# Patient Record
Sex: FEMALE | URBAN - METROPOLITAN AREA
[De-identification: names, ages, dates, MRNs, and addresses within clinical notes are randomized per-mention and may not be internally consistent; named-entity substitution may affect disease eponyms.]

---

## 2019-07-26 ENCOUNTER — APPOINTMENT (OUTPATIENT)
Age: 39
Setting detail: DERMATOLOGY
End: 2019-07-29

## 2019-07-26 VITALS — WEIGHT: 122 LBS | HEIGHT: 65 IN

## 2019-07-26 DIAGNOSIS — L81.1 CHLOASMA: ICD-10-CM

## 2019-07-26 PROCEDURE — 99213 OFFICE O/P EST LOW 20 MIN: CPT

## 2019-07-26 PROCEDURE — OTHER PRESCRIPTION: OTHER

## 2019-07-26 PROCEDURE — OTHER COUNSELING: OTHER

## 2019-07-26 RX ORDER — TRETINOIN 0.25 MG/G
CREAM TOPICAL QHS
Qty: 1 | Refills: 1 | Status: ERX | COMMUNITY
Start: 2019-07-26

## 2019-07-26 RX ORDER — HYDROQUINONE 4 %
CREAM (GRAM) TOPICAL QHS
Qty: 30 | Refills: 1 | Status: CANCELLED
Stop reason: CLARIF

## 2019-07-26 ASSESSMENT — LOCATION DETAILED DESCRIPTION DERM
LOCATION DETAILED: LEFT INFERIOR FOREHEAD
LOCATION DETAILED: LEFT INFERIOR MEDIAL MALAR CHEEK
LOCATION DETAILED: LEFT INFERIOR MEDIAL FOREHEAD

## 2019-07-26 ASSESSMENT — LOCATION ZONE DERM: LOCATION ZONE: FACE

## 2019-07-26 ASSESSMENT — LOCATION SIMPLE DESCRIPTION DERM
LOCATION SIMPLE: LEFT CHEEK
LOCATION SIMPLE: LEFT FOREHEAD

## 2019-07-26 NOTE — PROCEDURE: COUNSELING
Detail Level: Detailed
Patient Specific Counseling (Will Not Stick From Patient To Patient): Instead of hydroquinone again, she will purchase Lytera 2.0 today.

## 2019-12-03 ENCOUNTER — APPOINTMENT (OUTPATIENT)
Dept: GENERAL RADIOLOGY | Facility: CLINIC | Age: 39
End: 2019-12-03
Attending: EMERGENCY MEDICINE

## 2019-12-03 ENCOUNTER — HOSPITAL ENCOUNTER (EMERGENCY)
Facility: CLINIC | Age: 39
Discharge: HOME | End: 2019-12-03
Attending: EMERGENCY MEDICINE | Admitting: EMERGENCY MEDICINE

## 2019-12-03 VITALS
DIASTOLIC BLOOD PRESSURE: 64 MMHG | TEMPERATURE: 100.6 F | OXYGEN SATURATION: 97 % | HEART RATE: 71 BPM | SYSTOLIC BLOOD PRESSURE: 108 MMHG | RESPIRATION RATE: 18 BRPM

## 2019-12-03 DIAGNOSIS — J06.9 VIRAL URI WITH COUGH: ICD-10-CM

## 2019-12-03 LAB
DEPRECATED S PYO AG THROAT QL EIA: NORMAL
FLUAV+FLUBV AG SPEC QL: NEGATIVE
FLUAV+FLUBV AG SPEC QL: NEGATIVE
SPECIMEN SOURCE: NORMAL
SPECIMEN SOURCE: NORMAL

## 2019-12-03 PROCEDURE — 99284 EMERGENCY DEPT VISIT MOD MDM: CPT | Mod: 25

## 2019-12-03 PROCEDURE — 87081 CULTURE SCREEN ONLY: CPT | Performed by: EMERGENCY MEDICINE

## 2019-12-03 PROCEDURE — 87804 INFLUENZA ASSAY W/OPTIC: CPT | Performed by: EMERGENCY MEDICINE

## 2019-12-03 PROCEDURE — 87880 STREP A ASSAY W/OPTIC: CPT | Performed by: EMERGENCY MEDICINE

## 2019-12-03 PROCEDURE — 94640 AIRWAY INHALATION TREATMENT: CPT

## 2019-12-03 PROCEDURE — 25000125 ZZHC RX 250: Performed by: EMERGENCY MEDICINE

## 2019-12-03 PROCEDURE — 71046 X-RAY EXAM CHEST 2 VIEWS: CPT

## 2019-12-03 PROCEDURE — 25000132 ZZH RX MED GY IP 250 OP 250 PS 637: Performed by: EMERGENCY MEDICINE

## 2019-12-03 RX ORDER — IPRATROPIUM BROMIDE AND ALBUTEROL SULFATE 2.5; .5 MG/3ML; MG/3ML
3 SOLUTION RESPIRATORY (INHALATION) ONCE
Status: COMPLETED | OUTPATIENT
Start: 2019-12-03 | End: 2019-12-03

## 2019-12-03 RX ORDER — LORATADINE 10 MG/1
10 TABLET ORAL DAILY PRN
Qty: 30 TABLET | Refills: 0 | Status: SHIPPED | OUTPATIENT
Start: 2019-12-03 | End: 2023-02-02

## 2019-12-03 RX ORDER — PREDNISONE 20 MG/1
TABLET ORAL
Qty: 10 TABLET | Refills: 0 | Status: SHIPPED | OUTPATIENT
Start: 2019-12-03 | End: 2023-02-02

## 2019-12-03 RX ORDER — ALBUTEROL SULFATE 90 UG/1
2 AEROSOL, METERED RESPIRATORY (INHALATION) EVERY 6 HOURS PRN
Qty: 1 INHALER | Refills: 0 | Status: SHIPPED | OUTPATIENT
Start: 2019-12-03 | End: 2023-02-02

## 2019-12-03 RX ORDER — IBUPROFEN 200 MG
600 TABLET ORAL EVERY 6 HOURS PRN
Qty: 60 TABLET | Refills: 0 | Status: SHIPPED | OUTPATIENT
Start: 2019-12-03 | End: 2023-02-02

## 2019-12-03 RX ORDER — IBUPROFEN 600 MG/1
600 TABLET, FILM COATED ORAL ONCE
Status: COMPLETED | OUTPATIENT
Start: 2019-12-03 | End: 2019-12-03

## 2019-12-03 RX ADMIN — IPRATROPIUM BROMIDE AND ALBUTEROL SULFATE 3 ML: .5; 3 SOLUTION RESPIRATORY (INHALATION) at 18:23

## 2019-12-03 RX ADMIN — IBUPROFEN 600 MG: 600 TABLET, FILM COATED ORAL at 18:22

## 2019-12-03 ASSESSMENT — ENCOUNTER SYMPTOMS
VOMITING: 0
COUGH: 1
NAUSEA: 1
SORE THROAT: 1
RHINORRHEA: 1
FEVER: 1

## 2019-12-03 NOTE — ED PROVIDER NOTES
History     Chief Complaint:  Pharyngitis and Cough      The history is provided by the spouse and the patient. The history is limited by a language barrier. No  was used ( interpreted).      Crescencio Cruz is a 39 year old female who presents with pharyngitis and a cough. Patient reports cough, rhinorrhea, and sore throat.  reports that the patient has had these symptoms for about 15 days. The rest of the patient's family has similar symptoms and patient's daughter had pneumonia about two weeks ago. However, patient comes in today because her symptoms are the worst of anyone in the family. Alongside her respiratory symptoms, patient endorses nausea and fever; temperature is 100.6 here. She has not vomited.     Allergies:  No Known Drug Allergies     Medications:    The patient is currently on no regular medications.    Past Medical History:    Heart murmur     Past Surgical History:    Tubal ligation     Family History:    Mother - myocardial infarction      Social History:  The patient was accompanied to the ED by her  and daughter.  Smoking Status: Never  Smokeless Tobacco: No  Alcohol Use: No   Marital Status:        Review of Systems   Constitutional: Positive for fever.   HENT: Positive for rhinorrhea and sore throat.    Respiratory: Positive for cough.    Gastrointestinal: Positive for nausea. Negative for vomiting.   All other systems reviewed and are negative.    Physical Exam     Patient Vitals for the past 24 hrs:   BP Temp Temp src Pulse Resp SpO2   12/03/19 1731 108/64 100.6  F (38.1  C) Temporal 71 18 97 %       Physical Exam  Constitutional: Vital signs reviewed as above.   HEENT:    Head: No external signs of trauma. No lesions noted.   Eyes: PEERL, EOMI B/L, no pain or limitation of superior gaze   Ears: Normal B/L TM and external canals   Nose: Noncongested, no exudates. No rhinorrhea. No FB noted   Mouth/Throat:     Mucous membranes are moist and  normal.     No Oropharyngeal exudate. No oropharyngeal erythema noted.    No tonsilar swelling noted.     No uvular deviation noted.    No swelling noted on the floor of the mouth  Neck: FROM. Neck is supple  Lymphatic: No posterior cervical LAD noted.   Cardiovascular: Normal rate, regular rhythm and normal heart sounds.  No murmur heard. Equal B/L peripheral pulses.  Pulmonary/Chest: Effort normal and breath sounds normal. No respiratory distress. Patient has no wheezes. Patient has faint crackles in the B/L bases.   Gastrointestinal: Soft. There is no tenderness.   Musculoskeletal/Extremities: No edema noted. Normal tone.  Neurological: Patient is alert and oriented to person, place, and time.   Skin: Skin is warm and dry. There is no diaphoresis noted.   Psychiatric: The patient appears calm.      Emergency Department Course     Imaging:  Radiology findings were communicated with the patient and family who voiced understanding of the findings.    Chest XR,  PA & LAT  Final Result  IMPRESSION: Lungs clear. Heart and hilar structures normal size.  Report per radiology     Laboratory:  Laboratory findings were communicated with the patient and family who voiced understanding of the findings.    Rapid strep screen: negative   Beta strep group A culture: pending      Influenza A/B Antigen: negative      Interventions:  1822 Ibuprofen 600mg PO   1823 DuoNeb 3mL Nebulization      Emergency Department Course:  Past medical records, nursing notes, and vitals reviewed.    1810 I performed an exam of the patient as documented above.     The patient's throat was swabbed and this sample was sent for rapid strep screen, findings above.   The patient's nose was swabbed and this sample was sent for influenza antigen, findings above.   The patient was sent for a CXR while in the emergency department, results above.     1939 I rechecked the patient and discussed the results of her workup thus far.     Findings and plan explained  to the Patient and spouse. Patient discharged home with instructions regarding supportive care, medications, and reasons to return. The importance of close follow-up was reviewed. The patient was prescribed Motrin, Albuterol, Prednisone, and Claritin.    I personally reviewed the laboratory and imaging results with the Patient and spouse and answered all related questions prior to discharge.     Impression & Plan     Medical Decision Making:  This 39-year-old female patient presents the ED due to upper respiratory infection and fever.  Please see the HPI and exam for specifics.  The patient's work-up, fortunately, was negative.  At this time I believe she can be discharged and she can treat what is likely a viral upper respiratory infection at home.  I will list a primary care clinic for further follow-up and the patient can return to the ED if she has worsening or concerning symptoms.  Anticipatory guidance given prior to discharge.    Discharge Diagnosis:    ICD-10-CM    1. Viral URI with cough J06.9     B97.89        Disposition:  Discharged to home.    Discharge Medications:  New Prescriptions    ALBUTEROL (PROAIR HFA/PROVENTIL HFA/VENTOLIN HFA) 108 (90 BASE) MCG/ACT INHALER    Inhale 2 puffs into the lungs every 6 hours as needed for shortness of breath / dyspnea or wheezing    IBUPROFEN (ADVIL/MOTRIN) 200 MG TABLET    Take 3 tablets (600 mg) by mouth every 6 hours as needed for mild pain or fever    LORATADINE (CLARITIN) 10 MG TABLET    Take 1 tablet (10 mg) by mouth daily as needed for allergies    PREDNISONE (DELTASONE) 20 MG TABLET    Take two tablets (= 40mg) each day for 5 (five) days       Scribe Disclosure:  IMarga, am serving as a scribe at 5:33 PM on 12/3/2019 to document services personally performed by Danny Roldan DO based on my observations and the provider's statements to me.   12/3/2019   [facility] EMERGENCY DEPARTMENT       Danny Roldan DO  12/03/19  1945

## 2019-12-03 NOTE — ED AVS SNAPSHOT
[facility]  [address]  [facility] [address]  Phone:  [phone]  Fax:  [phone]                                    Crescencio Cruz   MRN: [id number]    Department:  [facility] Emergency Department   Date of Visit:  12/3/2019           After Visit Summary Signature Page    I have received my discharge instructions, and my questions have been answered. I have discussed any challenges I see with this plan with the nurse or doctor.    ..........................................................................................................................................  Patient/Patient Representative Signature      ..........................................................................................................................................  Patient Representative Print Name and Relationship to Patient    ..................................................               ................................................  Date                                   Time    ..........................................................................................................................................  Reviewed by Signature/Title    ...................................................              ..............................................  Date                                               Time          22EPIC Rev 08/18

## 2019-12-04 NOTE — DISCHARGE INSTRUCTIONS
Fortunately, no bacterial infection was found.  Your influenza test is also negative.  I will prescribe some medication to help with your symptoms.  Use this as directed.  You may also use over-the-counter cough drops.  Please follow-up with the primary care clinic I will list.  Please return to the emergency department if you experience worsening shortness of breath, trouble breathing, bluish coloration of your skin or lips, or other symptoms that concern you.

## 2019-12-04 NOTE — RESULT ENCOUNTER NOTE
Preliminary Beta strep group A r/o culture is PENDING and/or NEGATIVE at this time.   No changes in treatment per [place] Strep protocol.

## 2019-12-05 LAB
BACTERIA SPEC CULT: NORMAL
Lab: NORMAL
SPECIMEN SOURCE: NORMAL

## 2019-12-05 NOTE — RESULT ENCOUNTER NOTE
Final Beta strep group A r/o culture is NEGATIVE for Group A streptococcus.    No treatment or change in treatment per [place] Strep protocol.

## 2023-01-19 ENCOUNTER — LAB REQUISITION (OUTPATIENT)
Dept: LAB | Facility: CLINIC | Age: 43
End: 2023-01-19

## 2023-01-19 DIAGNOSIS — R30.9 PAINFUL MICTURITION, UNSPECIFIED: ICD-10-CM

## 2023-01-19 PROCEDURE — 87491 CHLMYD TRACH DNA AMP PROBE: CPT

## 2023-01-19 PROCEDURE — 87591 N.GONORRHOEAE DNA AMP PROB: CPT

## 2023-01-20 LAB
C TRACH DNA SPEC QL NAA+PROBE: NEGATIVE
N GONORRHOEA DNA SPEC QL NAA+PROBE: NEGATIVE

## 2023-02-02 ENCOUNTER — OFFICE VISIT (OUTPATIENT)
Dept: FAMILY MEDICINE | Facility: CLINIC | Age: 43
End: 2023-02-02

## 2023-02-02 ENCOUNTER — LAB REQUISITION (OUTPATIENT)
Dept: LAB | Facility: CLINIC | Age: 43
End: 2023-02-02

## 2023-02-02 DIAGNOSIS — D49.2 NEOPLASM OF UNSPECIFIED BEHAVIOR OF BONE, SOFT TISSUE, AND SKIN: ICD-10-CM

## 2023-02-02 DIAGNOSIS — R53.83 OTHER FATIGUE: ICD-10-CM

## 2023-02-02 LAB — TSH SERPL DL<=0.005 MIU/L-ACNC: 2.65 UIU/ML (ref 0.3–4.2)

## 2023-02-02 PROCEDURE — 84443 ASSAY THYROID STIM HORMONE: CPT

## 2023-02-02 PROCEDURE — 88305 TISSUE EXAM BY PATHOLOGIST: CPT | Mod: TC | Performed by: INTERNAL MEDICINE

## 2023-02-03 NOTE — PROGRESS NOTES
"MEDICINE NOTE    SUBJECTIVE:  Ms. WOLF is a 42 year-old woman who presents with 2 years of growing \"balls on her arms\". She saw a provider at [facility] in January, and they referred her here for derm night. Ms. WOLF said she first noticed the lumps about 2 years ago. She cannot remember any triggers (trauma, changes in laundry detergent, etc) that would have caused the lumps. She said they originally started very small, and she is now worried because they have been increasing in size. She has 4 lumps on her right forearm, 1 on her left forearm, and 1 on the dorsal surface of her left 4th metacarpal. Ms. WOLF has arm pain when she is doing daily tasks such as showering, and sometimes her arms feel weak. Her arm pain is worst when bending her elbows. The bumps are also itchy at times, especially when they touch her clothing.     REVIEW OF SYSTEMS:  Gen: no fevers or chills  Musculoskeletal: as noted in HPI  Skin: as noted in HPI  Neuro: no loss of strength or sensation, no numbness or tingling  Allergy: no environmental or drug allergies    OBJECTIVE:  Physical Exam:  Constitutional: no distress, comfortable, pleasant   Musculoskeletal: full range of motion, no joint swelling, pain with elbow flexion  Skin: 4 indurated nodules forming agminated indurated plaque on proximal dorsal right forearm; 1 deep, firm, rice-sized nodule on left forearm in same location; 1 small, firm nodule on dorsal 4th metacarpal  Neurological: normal strength and sensation throughout    ASSESSMENT/PLAN:    # Neoplasm of unspecified behavior of skin  Ddx includes most likely cyst vs Granuloma annulare vs rheumatoid nodule. GA most likely given clinical presentation and features on exam, though whitish cheese-like substance seen on biopsy would favor cyst. Biopsy for H&E will assist in further differentiation, treatment choices to follow definitive diagnosis.  - Punch biopsy today, R forearm  - PUNCH BIOPSY PROCEDURE NOTE:  After written informed consent " was obtained, a time out was taken to confirm the patient's identity and the correct site for biopsy. The lesion on the R forearm was cleansed with a 70% isopropyl alcohol wipe and then injected with 0.55cc of lidocaine 1% with epinephrine [id number]. Once anesthesia was ensured, a 4mm punch biopsy tool was used to obtain a sample of lesional skin. The specimen was placed in a labeled formalin container and was delivered to the pathology lab for processing. Hemostasis was obtained with pressure and a single 4-0 Prolene suture. The wound was then dressed with petrolatum and an adhesive bandage. The patient tolerated the procedure well and received instruction regarding care of this site. Suture removal should occur in 14 days.      Med Clinician: Debi Domínguez, MS2  Preceptor: Awais Medrano MD    In supervising the medical student, I repeated the exam documented above.  I have reviewed and verified the student s documentation.  Supervising Provider:   Awais Medrano MD  Medicine/Dermatology PGY-2  *[phone]

## 2023-02-03 NOTE — PATIENT INSTRUCTIONS
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 Te llamaremos con mas informacion despues de el bioespia. Todos de las condiciones no son peligrosos. No hay tratamiento para el granuloma annulare si es esto. Va a salir despues du unos años.     Estudios de Laboratorio: NA    Seguimiento/Resultados: La doctora va a llamarle en las proximas dos semanas con los resultados desde la patologia de la biopsia de piel.     Referencias e Instrucciones:   1) Por favor regrese en dos semanas (26 de febrero 2023) para sacar la puntada.   2) Fadia ibuprofeno cuando se necesita para el dolor del brazo.     Medico: Dr. Awais Medrano M.D.

## 2023-02-03 NOTE — PROGRESS NOTES
[facility] Progress Note    Chief complaint: Bumps on arm     Last date of full med: NA    Subjective:  DB is a 42 year female that presented with four raised bumps on her right forearm, and two on her left forearm/hand. The bumps have been there for two years and are growing in size. She stated that the bumps hurt often and the pain can be up to an 8 out of 10 on a pain scale. She said the pain was the worst when her arm was bent or raised above her head. She also stated the bumps itch. She has no documented past medical history. She is not currently on any medications. A biopsy was conducted and sent to lab to conduct further test. Patients job and living situation was not addressed in this visit.       No current outpatient medications on file.         Objective:  There were no vitals taken for this visit.      Assessment:     DTP: Needs additional monitoring for cyst/nodule  Rationale: Punch Biopsy conducted and sent for testing      DTP: Needs additional drug therapy for pain  Rationale: Patient needs additional drug therapy to control pain associated with bumps on her arm.       Plan:  1. Initiate ibuprofen 200 mg to 400 mg every four to six hours as needed for pain.    Follow-Up:  2. Follow up in four days to discuss lab results  3. Follow up in two weeks to remove stiches    Pharmacy Clinician: SHERITA  Pharmacy Preceptor:   Alec Modi Pharm.D.    _____________________________  Preceptor Use Only:  In supervising the student, I have reviewed and verified the student's documentation and found it to be correct and complete.   Preceptor Signature:     Alec Modi Pharm.D.

## 2023-02-08 LAB
PATH REPORT.COMMENTS IMP SPEC: NORMAL
PATH REPORT.COMMENTS IMP SPEC: NORMAL
PATH REPORT.FINAL DX SPEC: NORMAL
PATH REPORT.GROSS SPEC: NORMAL
PATH REPORT.MICROSCOPIC SPEC OTHER STN: NORMAL
PATH REPORT.RELEVANT HX SPEC: NORMAL

## 2023-02-16 ENCOUNTER — TELEPHONE (OUTPATIENT)
Dept: FAMILY MEDICINE | Facility: CLINIC | Age: 43
End: 2023-02-16

## 2023-02-16 DIAGNOSIS — L94.2 CALCINOSIS CUTIS: Primary | ICD-10-CM

## 2023-02-17 NOTE — TELEPHONE ENCOUNTER
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 WOLF's punch biopsy results from 2/2/23. A  was present for the conversation.    Ms. 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 Due to the common association of calcinosis cutis with rheumatological disease, additional workup is recommended to determine the cause of the calcinosis cutis. [facility] does not have the laboratory resources to complete a more thorough workup, so we recommend Ms. WOLF see a PCP at [facility] in the upcoming weeks.    This information was shared with Ms. WOLF who wanted help setting up an appointment at [facility]. Our clinic coordinator called [facility] and set up an appointment for the patient on 2/21/23. The patient was notified of this via phone call. Ms. WOLF also requested a print out of her diagnosis and plan, which were provided to her in [place]ian. Ms. WOLF inquired about removal of the calcinosis cutis - we discussed following up at [facility] to determine the cause before moving forward with a treatment plan.    Plan  1. Refer patient to PCP at [facility] for a comprehensive rheumatological workup to determine the cause of the calcinosis cutis - appointment set up at [facility] for 2/21/23  2. Consider referral to rheumatology    Med Clinician: Debi Domínguez, MS2  Preceptors Involved in Service: Dr. Awais Medrano MD    In supervising the medical student, I have reviewed and verified the student s documentation. I discussed the case and plan with the medical student prior to the call but was not present for the visit and did not speak directly to the patient. Awais Medrano MD    Patient's care discussed with Dr. Henderson, who agrees with the above assessment and plan.    Awais Medrano MD  Medicine/Dermatology PGY-2  *[phone]

## 2023-03-02 ENCOUNTER — LAB REQUISITION (OUTPATIENT)
Dept: LAB | Facility: CLINIC | Age: 43
End: 2023-03-02

## 2023-03-02 DIAGNOSIS — R10.31 RIGHT LOWER QUADRANT PAIN: ICD-10-CM

## 2023-03-02 LAB
ALBUMIN SERPL BCG-MCNC: 4.1 G/DL (ref 3.5–5.2)
ALP SERPL-CCNC: 116 U/L (ref 35–104)
ALT SERPL W P-5'-P-CCNC: 43 U/L (ref 10–35)
ANION GAP SERPL CALCULATED.3IONS-SCNC: 9 MMOL/L (ref 7–15)
AST SERPL W P-5'-P-CCNC: 38 U/L (ref 10–35)
BILIRUB SERPL-MCNC: 0.2 MG/DL
BUN SERPL-MCNC: 19 MG/DL (ref 6–20)
CALCIUM SERPL-MCNC: 9.4 MG/DL (ref 8.6–10)
CHLORIDE SERPL-SCNC: 105 MMOL/L (ref 98–107)
CREAT SERPL-MCNC: 0.7 MG/DL (ref 0.51–0.95)
DEPRECATED HCO3 PLAS-SCNC: 20 MMOL/L (ref 22–29)
GFR SERPL CREATININE-BSD FRML MDRD: >90 ML/MIN/1.73M2
GLUCOSE SERPL-MCNC: 91 MG/DL (ref 70–99)
POTASSIUM SERPL-SCNC: 4.8 MMOL/L (ref 3.4–5.3)
PROT SERPL-MCNC: 7.2 G/DL (ref 6.4–8.3)
SODIUM SERPL-SCNC: 134 MMOL/L (ref 136–145)

## 2023-03-02 PROCEDURE — 80053 COMPREHEN METABOLIC PANEL: CPT

## 2023-07-20 ENCOUNTER — LAB REQUISITION (OUTPATIENT)
Dept: LAB | Facility: CLINIC | Age: 43
End: 2023-07-20

## 2023-07-20 DIAGNOSIS — F41.9 ANXIETY DISORDER, UNSPECIFIED: ICD-10-CM

## 2023-07-20 PROCEDURE — 84443 ASSAY THYROID STIM HORMONE: CPT | Performed by: INTERNAL MEDICINE

## 2023-07-21 LAB — TSH SERPL DL<=0.005 MIU/L-ACNC: 1.96 UIU/ML (ref 0.3–4.2)

## 2024-09-25 ENCOUNTER — LAB REQUISITION (OUTPATIENT)
Dept: LAB | Facility: CLINIC | Age: 44
End: 2024-09-25

## 2024-09-25 DIAGNOSIS — N39.0 URINARY TRACT INFECTION, SITE NOT SPECIFIED: ICD-10-CM

## 2024-09-25 PROCEDURE — 87086 URINE CULTURE/COLONY COUNT: CPT | Performed by: FAMILY MEDICINE

## 2024-09-25 PROCEDURE — 87186 SC STD MICRODIL/AGAR DIL: CPT | Performed by: FAMILY MEDICINE

## 2024-09-26 LAB — BACTERIA UR CULT: ABNORMAL

## 2024-10-02 ENCOUNTER — LAB REQUISITION (OUTPATIENT)
Dept: LAB | Facility: CLINIC | Age: 44
End: 2024-10-02

## 2024-10-02 DIAGNOSIS — I95.9 HYPOTENSION, UNSPECIFIED: ICD-10-CM

## 2024-10-02 DIAGNOSIS — I73.00 RAYNAUD'S SYNDROME WITHOUT GANGRENE: ICD-10-CM

## 2024-10-02 DIAGNOSIS — R74.01 ELEVATION OF LEVELS OF LIVER TRANSAMINASE LEVELS: ICD-10-CM

## 2024-10-02 LAB
ALBUMIN SERPL BCG-MCNC: 4.2 G/DL (ref 3.5–5.2)
ALP SERPL-CCNC: 127 U/L (ref 40–150)
ALT SERPL W P-5'-P-CCNC: 45 U/L (ref 0–50)
ANION GAP SERPL CALCULATED.3IONS-SCNC: 11 MMOL/L (ref 7–15)
AST SERPL W P-5'-P-CCNC: 38 U/L (ref 0–45)
BILIRUB SERPL-MCNC: <0.2 MG/DL
BUN SERPL-MCNC: 21.3 MG/DL (ref 6–20)
CALCIUM SERPL-MCNC: 9 MG/DL (ref 8.8–10.4)
CHLORIDE SERPL-SCNC: 105 MMOL/L (ref 98–107)
CREAT SERPL-MCNC: 0.65 MG/DL (ref 0.51–0.95)
EGFRCR SERPLBLD CKD-EPI 2021: >90 ML/MIN/1.73M2
GLUCOSE SERPL-MCNC: 89 MG/DL (ref 70–99)
HCO3 SERPL-SCNC: 23 MMOL/L (ref 22–29)
HCV AB SERPL QL IA: NONREACTIVE
POTASSIUM SERPL-SCNC: 4.3 MMOL/L (ref 3.4–5.3)
PROT SERPL-MCNC: 7.4 G/DL (ref 6.4–8.3)
SODIUM SERPL-SCNC: 139 MMOL/L (ref 135–145)
TSH SERPL DL<=0.005 MIU/L-ACNC: 2.89 UIU/ML (ref 0.3–4.2)

## 2024-10-02 PROCEDURE — 86038 ANTINUCLEAR ANTIBODIES: CPT | Performed by: FAMILY MEDICINE

## 2024-10-02 PROCEDURE — 86803 HEPATITIS C AB TEST: CPT | Performed by: FAMILY MEDICINE

## 2024-10-02 PROCEDURE — 84443 ASSAY THYROID STIM HORMONE: CPT | Performed by: FAMILY MEDICINE

## 2024-10-02 PROCEDURE — 80053 COMPREHEN METABOLIC PANEL: CPT | Performed by: FAMILY MEDICINE

## 2024-10-04 ENCOUNTER — MEDICAL CORRESPONDENCE (OUTPATIENT)
Dept: HEALTH INFORMATION MANAGEMENT | Facility: CLINIC | Age: 44
End: 2024-10-04

## 2024-10-04 LAB
ANA PAT SER IF-IMP: ABNORMAL
ANA SER QL IF: POSITIVE
ANA TITR SER IF: ABNORMAL {TITER}

## 2024-10-08 ENCOUNTER — TRANSCRIBE ORDERS (OUTPATIENT)
Dept: OTHER | Age: 44
End: 2024-10-08

## 2024-10-08 DIAGNOSIS — I73.00 RAYNAUD'S DISEASE WITHOUT GANGRENE: Primary | ICD-10-CM

## 2024-10-11 ENCOUNTER — LAB REQUISITION (OUTPATIENT)
Dept: LAB | Facility: CLINIC | Age: 44
End: 2024-10-11

## 2024-10-11 DIAGNOSIS — D64.9 ANEMIA, UNSPECIFIED: ICD-10-CM

## 2024-10-11 PROCEDURE — 82728 ASSAY OF FERRITIN: CPT | Performed by: INTERNAL MEDICINE

## 2024-10-11 PROCEDURE — 83550 IRON BINDING TEST: CPT | Performed by: INTERNAL MEDICINE

## 2024-10-11 PROCEDURE — 82607 VITAMIN B-12: CPT | Performed by: INTERNAL MEDICINE

## 2024-10-12 LAB
FERRITIN SERPL-MCNC: 19 NG/ML (ref 6–175)
IRON BINDING CAPACITY (ROCHE): 369 UG/DL (ref 240–430)
IRON SATN MFR SERPL: 15 % (ref 15–46)
IRON SERPL-MCNC: 55 UG/DL (ref 37–145)
VIT B12 SERPL-MCNC: 480 PG/ML (ref 232–1245)

## 2024-10-16 ENCOUNTER — LAB REQUISITION (OUTPATIENT)
Dept: LAB | Facility: CLINIC | Age: 44
End: 2024-10-16

## 2024-10-16 DIAGNOSIS — R74.01 ELEVATION OF LEVELS OF LIVER TRANSAMINASE LEVELS: ICD-10-CM

## 2024-10-16 DIAGNOSIS — I73.00 RAYNAUD'S SYNDROME WITHOUT GANGRENE: ICD-10-CM

## 2024-10-16 PROCEDURE — 86431 RHEUMATOID FACTOR QUANT: CPT | Performed by: INTERNAL MEDICINE

## 2024-10-16 PROCEDURE — 87340 HEPATITIS B SURFACE AG IA: CPT | Performed by: INTERNAL MEDICINE

## 2024-10-16 PROCEDURE — 86706 HEP B SURFACE ANTIBODY: CPT | Performed by: INTERNAL MEDICINE

## 2024-10-16 PROCEDURE — 86704 HEP B CORE ANTIBODY TOTAL: CPT | Performed by: INTERNAL MEDICINE

## 2024-10-17 LAB
HBV CORE AB SERPL QL IA: NONREACTIVE
HBV SURFACE AB SERPL IA-ACNC: <3.5 M[IU]/ML
HBV SURFACE AB SERPL IA-ACNC: NONREACTIVE M[IU]/ML
HBV SURFACE AG SERPL QL IA: NONREACTIVE
RHEUMATOID FACT SERPL-ACNC: <10 IU/ML

## 2024-11-15 ENCOUNTER — LAB REQUISITION (OUTPATIENT)
Dept: LAB | Facility: CLINIC | Age: 44
End: 2024-11-15

## 2024-11-15 DIAGNOSIS — R30.0 DYSURIA: ICD-10-CM

## 2024-11-15 DIAGNOSIS — J02.9 ACUTE PHARYNGITIS, UNSPECIFIED: ICD-10-CM

## 2024-11-15 PROCEDURE — 87081 CULTURE SCREEN ONLY: CPT | Performed by: INTERNAL MEDICINE

## 2024-11-17 LAB — BACTERIA SPEC CULT: NORMAL

## 2025-04-21 ENCOUNTER — LAB REQUISITION (OUTPATIENT)
Dept: LAB | Facility: CLINIC | Age: 45
End: 2025-04-21

## 2025-04-21 DIAGNOSIS — R30.0 DYSURIA: ICD-10-CM

## 2025-04-21 PROCEDURE — 87086 URINE CULTURE/COLONY COUNT: CPT | Performed by: NURSE PRACTITIONER

## 2025-04-23 LAB — BACTERIA UR CULT: NORMAL
